# Patient Record
Sex: MALE | Race: WHITE
[De-identification: names, ages, dates, MRNs, and addresses within clinical notes are randomized per-mention and may not be internally consistent; named-entity substitution may affect disease eponyms.]

---

## 2019-01-15 ENCOUNTER — HOSPITAL ENCOUNTER (OUTPATIENT)
Dept: HOSPITAL 91 - GIL | Age: 59
Discharge: HOME | End: 2019-01-15
Payer: COMMERCIAL

## 2019-01-15 ENCOUNTER — HOSPITAL ENCOUNTER (OUTPATIENT)
Dept: HOSPITAL 10 - GIL | Age: 59
Discharge: HOME | End: 2019-01-15
Attending: INTERNAL MEDICINE
Payer: COMMERCIAL

## 2019-01-15 VITALS
BODY MASS INDEX: 29.29 KG/M2 | HEIGHT: 70 IN | BODY MASS INDEX: 29.29 KG/M2 | HEIGHT: 70 IN | WEIGHT: 204.59 LBS | WEIGHT: 204.59 LBS

## 2019-01-15 VITALS — RESPIRATION RATE: 16 BRPM | DIASTOLIC BLOOD PRESSURE: 75 MMHG | SYSTOLIC BLOOD PRESSURE: 118 MMHG | HEART RATE: 76 BPM

## 2019-01-15 VITALS — RESPIRATION RATE: 18 BRPM | SYSTOLIC BLOOD PRESSURE: 139 MMHG | HEART RATE: 74 BPM | DIASTOLIC BLOOD PRESSURE: 81 MMHG

## 2019-01-15 DIAGNOSIS — K64.8: ICD-10-CM

## 2019-01-15 DIAGNOSIS — Z12.11: Primary | ICD-10-CM

## 2019-01-15 DIAGNOSIS — D12.5: ICD-10-CM

## 2019-01-15 PROCEDURE — 88305 TISSUE EXAM BY PATHOLOGIST: CPT

## 2019-01-15 PROCEDURE — 45380 COLONOSCOPY AND BIOPSY: CPT

## 2019-01-15 NOTE — PREAC
Date/Time of Note


Date/Time of Note


DATE: 1/15/19 


TIME: 13:25





Anesthesia Eval and Record


Evaluation


Time Pre-Procedure Interview


DATE: 1/15/19 


TIME: 13:25


Age


58


Sex


male


NPO:  8 hrs


Preoperative diagnosis


screening


Planned procedure


colonoscopy





Past Medical History


Past Medical History:  Includes


Renal:  Other (s/p bilateral orchiectomy for undescended testes, kidney stones)





Surgery & Anesthesia Issues


No known issue





Meds


Anticoagulation:  No


Beta Blocker within 24 hr:  No


Reason Beta Blocker not given:  Pt. not on B-Blocker


Meds reviewed:  Yes





Allergies


Coded Allergies:  


     No Known Allergy (Unverified , 1/15/19)


Allergies Reviewed:  Yes





Labs/Studies


Labs Reviewed:  Reviewed by anesthesiologist


Pregnancy test:  N/A





Pre-procedure Exam


Airway:  Adequate mouth opening, Adequate thyromental dist


Mallampati:  Mallampati II


Teeth:  Normal


Lung:  Normal


Heart:  Normal





ASA Physical Status


ASA physical status:  2


Emergency:  None





Planned Anesthetic


General/MAC:  MAC





Planned Pain Management


Parenteral pain med





Pre-operative Attestations


Prior to commencing anesthesia and surgery, the patient was re-evaluated, there 


was verification of:


*The patient's identity


*The results of appropriate recent lab work and preoperative vital signs


*The above evaluation not changing prior to induction


*Anesthetic plan, risk benefits, alternative and complications discussed with 


patient/family; questions answered; patient/family understands, accepts and w


ishes to proceed.











RYLEE DONNELLY MD                Esau 15, 2019 13:26